# Patient Record
Sex: FEMALE | Race: WHITE
[De-identification: names, ages, dates, MRNs, and addresses within clinical notes are randomized per-mention and may not be internally consistent; named-entity substitution may affect disease eponyms.]

---

## 2018-09-24 ENCOUNTER — HOSPITAL ENCOUNTER (EMERGENCY)
Dept: HOSPITAL 61 - ER | Age: 68
Discharge: HOME | End: 2018-09-24
Payer: COMMERCIAL

## 2018-09-24 VITALS — WEIGHT: 184 LBS | BODY MASS INDEX: 30.66 KG/M2 | HEIGHT: 65 IN

## 2018-09-24 VITALS — SYSTOLIC BLOOD PRESSURE: 164 MMHG | DIASTOLIC BLOOD PRESSURE: 75 MMHG

## 2018-09-24 DIAGNOSIS — S70.02XA: ICD-10-CM

## 2018-09-24 DIAGNOSIS — S40.012A: ICD-10-CM

## 2018-09-24 DIAGNOSIS — R51: ICD-10-CM
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
## 2018-09-24 DIAGNOSIS — Z88.8: ICD-10-CM

## 2018-09-24 DIAGNOSIS — E03.9: ICD-10-CM

## 2018-09-24 DIAGNOSIS — Z88.5: ICD-10-CM
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
## 2018-09-24 DIAGNOSIS — Z88.1: ICD-10-CM

## 2018-09-24 DIAGNOSIS — S80.11XA: ICD-10-CM
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
## 2018-09-24 DIAGNOSIS — Z90.710: ICD-10-CM

## 2018-09-24 DIAGNOSIS — S16.1XXA: Primary | ICD-10-CM

## 2018-09-24 DIAGNOSIS — S20.212A: ICD-10-CM
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
## 2018-09-24 LAB
AGAP ISTAT: 15 MMOL/L (ref 6–14)
BASOPHILS # BLD AUTO: 0.1 X10^3/UL (ref 0–0.2)
BASOPHILS NFR BLD: 1 % (ref 0–3)
BUN ISTAT: 25 MG/DL (ref 8–26)
CHLORIDE SERPL-SCNC: 106 MMOL/L (ref 98–110)
CO2 BLD-SCNC: 23 MMOL/L (ref 23–32)
CREATININE ISTAT: 1 MG/DL (ref 0.5–1.4)
EOSINOPHIL NFR BLD: 0.2 X10^3/UL (ref 0–0.7)
EOSINOPHIL NFR BLD: 1 % (ref 0–3)
ERYTHROCYTE [DISTWIDTH] IN BLOOD BY AUTOMATED COUNT: 13 % (ref 11.5–14.5)
GLUCOSE BLD-MCNC: 101 MG/DL (ref 70–99)
HCT VFR BLD AUTO: 40 % (ref 36–40)
HCT VFR BLD CALC: 39.6 % (ref 36–47)
HEMOGLOBIN ISTAT: 13.6 G/DL (ref 12–15)
HGB BLD-MCNC: 13.6 G/DL (ref 12–15.5)
ION CA ISTAT: 1.16 MMOL/L (ref 1.13–1.32)
LYMPHOCYTES # BLD: 1.2 X10^3/UL (ref 1–4.8)
LYMPHOCYTES NFR BLD AUTO: 11 % (ref 24–48)
MCH RBC QN AUTO: 31 PG (ref 25–35)
MCHC RBC AUTO-ENTMCNC: 34 G/DL (ref 31–37)
MCV RBC AUTO: 90 FL (ref 79–100)
MONO #: 0.5 X10^3/UL (ref 0–1.1)
MONOCYTES NFR BLD: 5 % (ref 0–9)
NEUT #: 9.3 X10^3UL (ref 1.8–7.7)
NEUTROPHILS NFR BLD AUTO: 83 % (ref 31–73)
PLATELET # BLD AUTO: 236 X10^3/UL (ref 140–400)
POTASSIUM BLD-SCNC: 4.2 MMOL/L (ref 3.5–5)
RBC # BLD AUTO: 4.4 X10^6/UL (ref 3.5–5.4)
SODIUM SERPL-SCNC: 139 MMOL/L (ref 135–145)
WBC # BLD AUTO: 11.2 X10^3/UL (ref 4–11)

## 2018-09-24 PROCEDURE — 96375 TX/PRO/DX INJ NEW DRUG ADDON: CPT

## 2018-09-24 PROCEDURE — 36415 COLL VENOUS BLD VENIPUNCTURE: CPT

## 2018-09-24 PROCEDURE — 72125 CT NECK SPINE W/O DYE: CPT

## 2018-09-24 PROCEDURE — 85025 COMPLETE CBC W/AUTO DIFF WBC: CPT

## 2018-09-24 PROCEDURE — 71045 X-RAY EXAM CHEST 1 VIEW: CPT

## 2018-09-24 PROCEDURE — 90471 IMMUNIZATION ADMIN: CPT

## 2018-09-24 PROCEDURE — 70450 CT HEAD/BRAIN W/O DYE: CPT

## 2018-09-24 PROCEDURE — 74177 CT ABD & PELVIS W/CONTRAST: CPT

## 2018-09-24 PROCEDURE — 71260 CT THORAX DX C+: CPT

## 2018-09-24 PROCEDURE — 99285 EMERGENCY DEPT VISIT HI MDM: CPT

## 2018-09-24 PROCEDURE — 80047 BASIC METABLC PNL IONIZED CA: CPT

## 2018-09-24 PROCEDURE — 73020 X-RAY EXAM OF SHOULDER: CPT

## 2018-09-24 PROCEDURE — 73590 X-RAY EXAM OF LOWER LEG: CPT

## 2018-09-24 PROCEDURE — 96374 THER/PROPH/DIAG INJ IV PUSH: CPT

## 2018-09-24 PROCEDURE — 93005 ELECTROCARDIOGRAM TRACING: CPT

## 2018-09-24 PROCEDURE — 90715 TDAP VACCINE 7 YRS/> IM: CPT
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
## 2021-01-15 ENCOUNTER — HOSPITAL ENCOUNTER (OUTPATIENT)
Dept: HOSPITAL 63 - LAB | Age: 71
End: 2021-01-15
Attending: NURSE ANESTHETIST, CERTIFIED REGISTERED
Payer: MEDICARE

## 2021-01-15 DIAGNOSIS — Z01.812: Primary | ICD-10-CM

## 2021-01-15 DIAGNOSIS — Z20.828: ICD-10-CM

## 2021-01-15 PROCEDURE — U0003 INFECTIOUS AGENT DETECTION BY NUCLEIC ACID (DNA OR RNA); SEVERE ACUTE RESPIRATORY SYNDROME CORONAVIRUS 2 (SARS-COV-2) (CORONAVIRUS DISEASE [COVID-19]), AMPLIFIED PROBE TECHNIQUE, MAKING USE OF HIGH THROUGHPUT TECHNOLOGIES AS DESCRIBED BY CMS-2020-01-R: HCPCS

## 2021-01-19 ENCOUNTER — HOSPITAL ENCOUNTER (OUTPATIENT)
Dept: HOSPITAL 63 - SURG | Age: 71
Discharge: HOME | End: 2021-01-19
Attending: INTERNAL MEDICINE
Payer: MEDICARE

## 2021-01-19 VITALS
DIASTOLIC BLOOD PRESSURE: 71 MMHG | SYSTOLIC BLOOD PRESSURE: 125 MMHG | SYSTOLIC BLOOD PRESSURE: 125 MMHG | DIASTOLIC BLOOD PRESSURE: 71 MMHG

## 2021-01-19 DIAGNOSIS — Z72.89: ICD-10-CM

## 2021-01-19 DIAGNOSIS — Z12.11: Primary | ICD-10-CM

## 2021-01-19 DIAGNOSIS — Z88.8: ICD-10-CM

## 2021-01-19 DIAGNOSIS — Z79.899: ICD-10-CM

## 2021-01-19 PROCEDURE — 45378 DIAGNOSTIC COLONOSCOPY: CPT

## 2021-07-02 ENCOUNTER — HOSPITAL ENCOUNTER (OUTPATIENT)
Dept: HOSPITAL 63 - LAB | Age: 71
End: 2021-07-02
Attending: NURSE ANESTHETIST, CERTIFIED REGISTERED
Payer: MEDICARE

## 2021-07-02 DIAGNOSIS — R13.10: ICD-10-CM

## 2021-07-02 DIAGNOSIS — Z01.812: Primary | ICD-10-CM

## 2021-07-02 DIAGNOSIS — Z20.822: ICD-10-CM

## 2021-07-02 PROCEDURE — U0003 INFECTIOUS AGENT DETECTION BY NUCLEIC ACID (DNA OR RNA); SEVERE ACUTE RESPIRATORY SYNDROME CORONAVIRUS 2 (SARS-COV-2) (CORONAVIRUS DISEASE [COVID-19]), AMPLIFIED PROBE TECHNIQUE, MAKING USE OF HIGH THROUGHPUT TECHNOLOGIES AS DESCRIBED BY CMS-2020-01-R: HCPCS

## 2021-07-06 ENCOUNTER — HOSPITAL ENCOUNTER (OUTPATIENT)
Dept: HOSPITAL 63 - SURG | Age: 71
Discharge: HOME | End: 2021-07-06
Attending: INTERNAL MEDICINE
Payer: MEDICARE

## 2021-07-06 VITALS — SYSTOLIC BLOOD PRESSURE: 153 MMHG | DIASTOLIC BLOOD PRESSURE: 78 MMHG

## 2021-07-06 DIAGNOSIS — G47.33: ICD-10-CM

## 2021-07-06 DIAGNOSIS — Z79.899: ICD-10-CM

## 2021-07-06 DIAGNOSIS — K22.2: ICD-10-CM

## 2021-07-06 DIAGNOSIS — K21.9: ICD-10-CM

## 2021-07-06 DIAGNOSIS — E78.00: ICD-10-CM

## 2021-07-06 DIAGNOSIS — Z72.89: ICD-10-CM

## 2021-07-06 DIAGNOSIS — Z88.8: ICD-10-CM

## 2021-07-06 DIAGNOSIS — R12: ICD-10-CM

## 2021-07-06 DIAGNOSIS — E07.9: ICD-10-CM

## 2021-07-06 DIAGNOSIS — R13.10: Primary | ICD-10-CM

## 2021-07-06 DIAGNOSIS — K31.89: ICD-10-CM

## 2021-07-06 PROCEDURE — 43239 EGD BIOPSY SINGLE/MULTIPLE: CPT

## 2021-07-06 PROCEDURE — 43249 ESOPH EGD DILATION <30 MM: CPT

## 2021-07-06 PROCEDURE — 43450 DILATE ESOPHAGUS 1/MULT PASS: CPT

## 2022-01-28 ENCOUNTER — HOSPITAL ENCOUNTER (EMERGENCY)
Dept: HOSPITAL 63 - ER | Age: 72
Discharge: HOME | End: 2022-01-28
Payer: MEDICARE

## 2022-01-28 VITALS — SYSTOLIC BLOOD PRESSURE: 148 MMHG | DIASTOLIC BLOOD PRESSURE: 83 MMHG

## 2022-01-28 VITALS — BODY MASS INDEX: 31.66 KG/M2 | HEIGHT: 65 IN | WEIGHT: 190.04 LBS

## 2022-01-28 DIAGNOSIS — Z88.1: ICD-10-CM

## 2022-01-28 DIAGNOSIS — Z88.5: ICD-10-CM

## 2022-01-28 DIAGNOSIS — R07.89: Primary | ICD-10-CM

## 2022-01-28 DIAGNOSIS — Z88.8: ICD-10-CM

## 2022-01-28 LAB
ALBUMIN SERPL-MCNC: 4 G/DL (ref 3.4–5)
ALBUMIN/GLOB SERPL: 1.1 {RATIO} (ref 1–1.7)
ALP SERPL-CCNC: 70 U/L (ref 46–116)
ALT SERPL-CCNC: 47 U/L (ref 14–59)
ANION GAP SERPL CALC-SCNC: 9 MMOL/L (ref 6–14)
AST SERPL-CCNC: 34 U/L (ref 15–37)
BASOPHILS # BLD AUTO: 0.1 X10^3/UL (ref 0–0.2)
BASOPHILS NFR BLD: 1 % (ref 0–3)
BILIRUB SERPL-MCNC: 0.4 MG/DL (ref 0.2–1)
BUN/CREAT SERPL: 17 (ref 6–20)
CA-I SERPL ISE-MCNC: 19 MG/DL (ref 7–20)
CALCIUM SERPL-MCNC: 9.2 MG/DL (ref 8.5–10.1)
CHLORIDE SERPL-SCNC: 104 MMOL/L (ref 98–107)
CO2 SERPL-SCNC: 26 MMOL/L (ref 21–32)
CREAT SERPL-MCNC: 1.1 MG/DL (ref 0.6–1)
EOSINOPHIL NFR BLD: 0.2 X10^3/UL (ref 0–0.7)
EOSINOPHIL NFR BLD: 3 % (ref 0–3)
ERYTHROCYTE [DISTWIDTH] IN BLOOD BY AUTOMATED COUNT: 13.8 % (ref 11.5–14.5)
GFR SERPLBLD BASED ON 1.73 SQ M-ARVRAT: 49.2 ML/MIN
GLOBULIN SER-MCNC: 3.7 G/DL (ref 2.2–3.8)
GLUCOSE SERPL-MCNC: 107 MG/DL (ref 70–99)
HCT VFR BLD CALC: 41 % (ref 36–47)
HGB BLD-MCNC: 13.8 G/DL (ref 12–15.5)
LYMPHOCYTES # BLD: 1.9 X10^3/UL (ref 1–4.8)
LYMPHOCYTES NFR BLD AUTO: 26 % (ref 24–48)
MCH RBC QN AUTO: 30 PG (ref 25–35)
MCHC RBC AUTO-ENTMCNC: 34 G/DL (ref 31–37)
MCV RBC AUTO: 90 FL (ref 79–100)
MONO #: 0.7 X10^3/UL (ref 0–1.1)
MONOCYTES NFR BLD: 9 % (ref 0–9)
NEUT #: 4.3 X10^3UL (ref 1.8–7.7)
NEUTROPHILS NFR BLD AUTO: 60 % (ref 31–73)
PLATELET # BLD AUTO: 325 X10^3/UL (ref 140–400)
POTASSIUM SERPL-SCNC: 4.2 MMOL/L (ref 3.5–5.1)
PROT SERPL-MCNC: 7.7 G/DL (ref 6.4–8.2)
RBC # BLD AUTO: 4.55 X10^6/UL (ref 3.5–5.4)
SODIUM SERPL-SCNC: 139 MMOL/L (ref 136–145)
WBC # BLD AUTO: 7.1 X10^3/UL (ref 4–11)

## 2022-01-28 PROCEDURE — 80053 COMPREHEN METABOLIC PANEL: CPT

## 2022-01-28 PROCEDURE — 36415 COLL VENOUS BLD VENIPUNCTURE: CPT

## 2022-01-28 PROCEDURE — 71045 X-RAY EXAM CHEST 1 VIEW: CPT

## 2022-01-28 PROCEDURE — 99285 EMERGENCY DEPT VISIT HI MDM: CPT

## 2022-01-28 PROCEDURE — 84484 ASSAY OF TROPONIN QUANT: CPT

## 2022-01-28 PROCEDURE — 85025 COMPLETE CBC W/AUTO DIFF WBC: CPT

## 2022-01-28 PROCEDURE — 93005 ELECTROCARDIOGRAM TRACING: CPT

## 2022-01-28 NOTE — EKG
[facility] [address], [place] [address]

Test Date:    2022               Test Time:    07:03:38

Pat Name:     LORE BRADLEY            Department:   

Patient ID:   [id number]           Room:          

Gender:       F                        Technician:   TIMMY

:          1950               Requested By: KATHY CURRY

Order Number: [id number]            Reading MD:   Donato Pace

                                 Measurements

Intervals                              Axis          

Rate:         58                       P:            52

OH:           196                      QRS:          21

QRSD:         72                       T:            19

QT:           432                                    

QTc:          428                                    

                           Interpretive Statements

SINUS RHYTHM

NORMAL ECG

RI6.02

No previous ECG available for comparison

Electronically Signed On 2022 12:35:15 CST by Donato Pace
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
## 2024-09-22 NOTE — EKG
[facility]

              [address] [place] [address]

Test Date:    2018               Test Time:    18:54:52

Pat Name:     LORE BRADLEY            Department:   

Patient ID:   [id number]           Room:          

Gender:       F                        Technician:   

:          1950               Requested By: HELENE RAMOS

Order Number: [id number]           Reading MD:   Donato Pace

                                 Measurements

Intervals                              Axis          

Rate:         65                       P:            36

OH:           184                      QRS:          4

QRSD:         76                       T:            14

QT:           418                                    

QTc:          435                                    

                           Interpretive Statements

SINUS RHYTHM



Electronically Signed On 2018 11:15:06 CDT by Donato Pace No